# Patient Record
(demographics unavailable — no encounter records)

---

## 2025-01-17 NOTE — PHYSICAL EXAM
[General Appearance - Well Developed] : well developed [General Appearance - Well Nourished] : well nourished [Normal Appearance] : normal appearance [Well Groomed] : well groomed [General Appearance - In No Acute Distress] : no acute distress [Abdomen Soft] : soft [Abdomen Tenderness] : non-tender [Costovertebral Angle Tenderness] : no ~M costovertebral angle tenderness [Urethral Meatus] : meatus normal [Penis Abnormality] : normal circumcised penis [Urinary Bladder Findings] : the bladder was normal on palpation [Scrotum] : the scrotum was normal [Testes Tenderness] : no tenderness of the testes [Testes Mass (___cm)] : there were no testicular masses [Prostate Tenderness] : the prostate was not tender [No Prostate Nodules] : no prostate nodules [Prostate Size ___ gm] : prostate size [unfilled] gm [Edema] : no peripheral edema [] : no respiratory distress [Respiration, Rhythm And Depth] : normal respiratory rhythm and effort [Exaggerated Use Of Accessory Muscles For Inspiration] : no accessory muscle use [Oriented To Time, Place, And Person] : oriented to person, place, and time [Affect] : the affect was normal [Mood] : the mood was normal [Not Anxious] : not anxious [Normal Station and Gait] : the gait and station were normal for the patient's age [No Focal Deficits] : no focal deficits [No Palpable Adenopathy] : no palpable adenopathy

## 2025-01-17 NOTE — HISTORY OF PRESENT ILLNESS
[FreeTextEntry1] : 74 year old man seen 12/05/2022 for annual Gu exam. He had been on finasteride in the past for BPH, but he was asymptomatic so Dr Bosch agreed he could DC. No new LUTS stopping, no acute complaints today. PSA 1.2 (8/2022). Voiding well, No hematuria, no dysuria, no frequency, no urgency, no hesitancy, no straining. No incontinence. No fevers, no chills, no nausea, no vomiting, no flank pain.   12/18/2023: Patient presents for follow up. He denies any new or bothersome LUTS. PSA (12/2023): 1.41  01/17/2025: Patient presents for follow up. He denies any new or bothersome LUTS. Nocturia x1 not bothersome. Denies ED. PSA 1.32 (12/2024), UA micro neg (08/2024)

## 2025-01-17 NOTE — ASSESSMENT
[FreeTextEntry1] : 75 yo M with BPH, without bothersome LUTS. Will observe.  For prostate cancer screening, PSA acceptable and TRUPTI benign. RTO in 1 year for sx check and prostate cancer screening.